# Patient Record
Sex: MALE | Race: WHITE
[De-identification: names, ages, dates, MRNs, and addresses within clinical notes are randomized per-mention and may not be internally consistent; named-entity substitution may affect disease eponyms.]

---

## 2018-06-25 ENCOUNTER — HOSPITAL ENCOUNTER (OUTPATIENT)
Dept: HOSPITAL 33 - SDC | Age: 63
Discharge: HOME | End: 2018-06-25
Attending: FAMILY MEDICINE
Payer: COMMERCIAL

## 2018-06-25 VITALS — SYSTOLIC BLOOD PRESSURE: 130 MMHG | DIASTOLIC BLOOD PRESSURE: 79 MMHG | HEART RATE: 105 BPM

## 2018-06-25 VITALS — OXYGEN SATURATION: 98 %

## 2018-06-25 DIAGNOSIS — Z12.11: Primary | ICD-10-CM

## 2018-06-25 DIAGNOSIS — I48.91: ICD-10-CM

## 2018-06-25 NOTE — OP
SURGERY DATE/TIME:  06/25/2018   0726    



PREOPERATIVE DIAGNOSIS:      Screening exam.



POSTOPERATIVE DIAGNOSIS:      Normal colon. 



PROCEDURE:    Colonoscopy.



SURGEON:    Dr. Martinez.



ANESTHESIA:    MAC. Medications given by anesthesia department. 



HISTORY: The patient is a 62 year-old white male patient now presenting for his first 
screening colonoscopy. He was appraised of the risks of the procedure including the risk 
of perforation, phlebitis, untoward reaction to medication, bleeding and missed lesions.  
The patient verbalized his understanding and desired to have the procedure performed.



DESCRIPTION OF PROCEDURE: The patient was given the medications by the anesthesia 
department. He had continuous pulse oximetry, ECG monitoring, intermittent blood pressure 
monitoring and tidal CO2 monitoring during the examination. He was placed in the left 
lateral decubitus position.  A digital rectal examination was performed and revealed 
normal anal sphincter tone, no masses and normal prostate. The flexible Olympus pediatric 
colonoscope was used to intubate the rectum. A view of the colon was developed 
sequentially to the cecum.  Upon insertion and withdrawal, including a retroflex view in 
the rectum, no mucosal lesions were encountered.  The scope was removed from the patient 
who tolerated the procedure well and was sent back to the recovery room. It was noted the 
patient was in atrial fibrillation and has history of this and did not take his beta 
blocker medication and was tachycardic during the examination. He will be taken back to 
outpatient surgery. We will have to monitor his heart rate. If it stays above 100 he may 
end up having to be admitted for rate control.